# Patient Record
Sex: MALE | Race: WHITE
[De-identification: names, ages, dates, MRNs, and addresses within clinical notes are randomized per-mention and may not be internally consistent; named-entity substitution may affect disease eponyms.]

---

## 2021-07-03 ENCOUNTER — HOSPITAL ENCOUNTER (EMERGENCY)
Dept: HOSPITAL 75 - ER | Age: 66
Discharge: HOME | End: 2021-07-03
Payer: MEDICARE

## 2021-07-03 VITALS — BODY MASS INDEX: 25.58 KG/M2 | HEIGHT: 75.98 IN | WEIGHT: 210.1 LBS

## 2021-07-03 VITALS — SYSTOLIC BLOOD PRESSURE: 158 MMHG | DIASTOLIC BLOOD PRESSURE: 113 MMHG

## 2021-07-03 DIAGNOSIS — Z20.822: ICD-10-CM

## 2021-07-03 DIAGNOSIS — J98.09: ICD-10-CM

## 2021-07-03 DIAGNOSIS — F10.239: Primary | ICD-10-CM

## 2021-07-03 DIAGNOSIS — Z79.01: ICD-10-CM

## 2021-07-03 DIAGNOSIS — Z79.899: ICD-10-CM

## 2021-07-03 DIAGNOSIS — I11.0: ICD-10-CM

## 2021-07-03 DIAGNOSIS — I48.91: ICD-10-CM

## 2021-07-03 DIAGNOSIS — F41.9: ICD-10-CM

## 2021-07-03 DIAGNOSIS — F31.9: ICD-10-CM

## 2021-07-03 DIAGNOSIS — Z79.82: ICD-10-CM

## 2021-07-03 DIAGNOSIS — E11.9: ICD-10-CM

## 2021-07-03 DIAGNOSIS — Z79.84: ICD-10-CM

## 2021-07-03 DIAGNOSIS — I50.9: ICD-10-CM

## 2021-07-03 LAB
ALBUMIN SERPL-MCNC: 4.5 GM/DL (ref 3.2–4.5)
ALP SERPL-CCNC: 48 U/L (ref 40–136)
ALT SERPL-CCNC: 20 U/L (ref 0–55)
BASOPHILS # BLD AUTO: 0.1 10^3/UL (ref 0–0.1)
BASOPHILS NFR BLD AUTO: 1 % (ref 0–10)
BILIRUB SERPL-MCNC: 1.2 MG/DL (ref 0.1–1)
BUN/CREAT SERPL: 8
CALCIUM SERPL-MCNC: 9.5 MG/DL (ref 8.5–10.1)
CHLORIDE SERPL-SCNC: 95 MMOL/L (ref 98–107)
CO2 SERPL-SCNC: 19 MMOL/L (ref 21–32)
CREAT SERPL-MCNC: 1.02 MG/DL (ref 0.6–1.3)
EOSINOPHIL # BLD AUTO: 0.1 10^3/UL (ref 0–0.3)
EOSINOPHIL NFR BLD AUTO: 1 % (ref 0–10)
GFR SERPLBLD BASED ON 1.73 SQ M-ARVRAT: > 60 ML/MIN
GLUCOSE SERPL-MCNC: 151 MG/DL (ref 70–105)
HCT VFR BLD CALC: 38 % (ref 40–54)
HGB BLD-MCNC: 13.5 G/DL (ref 13.3–17.7)
LYMPHOCYTES # BLD AUTO: 0.9 10^3/UL (ref 1–4)
LYMPHOCYTES NFR BLD AUTO: 11 % (ref 12–44)
MANUAL DIFFERENTIAL PERFORMED BLD QL: NO
MCH RBC QN AUTO: 32 PG (ref 25–34)
MCHC RBC AUTO-ENTMCNC: 35 G/DL (ref 32–36)
MCV RBC AUTO: 90 FL (ref 80–99)
MONOCYTES # BLD AUTO: 0.6 10^3/UL (ref 0–1)
MONOCYTES NFR BLD AUTO: 7 % (ref 0–12)
NEUTROPHILS # BLD AUTO: 6.6 10^3/UL (ref 1.8–7.8)
NEUTROPHILS NFR BLD AUTO: 80 % (ref 42–75)
PLATELET # BLD: 179 10^3/UL (ref 130–400)
PMV BLD AUTO: 9.3 FL (ref 9–12.2)
POTASSIUM SERPL-SCNC: 4.7 MMOL/L (ref 3.6–5)
PROT SERPL-MCNC: 7.9 GM/DL (ref 6.4–8.2)
SODIUM SERPL-SCNC: 130 MMOL/L (ref 135–145)
WBC # BLD AUTO: 8.2 10^3/UL (ref 4.3–11)

## 2021-07-03 PROCEDURE — 93005 ELECTROCARDIOGRAM TRACING: CPT

## 2021-07-03 PROCEDURE — 87636 SARSCOV2 & INF A&B AMP PRB: CPT

## 2021-07-03 PROCEDURE — 80053 COMPREHEN METABOLIC PANEL: CPT

## 2021-07-03 PROCEDURE — 36415 COLL VENOUS BLD VENIPUNCTURE: CPT

## 2021-07-03 PROCEDURE — 84484 ASSAY OF TROPONIN QUANT: CPT

## 2021-07-03 PROCEDURE — 83880 ASSAY OF NATRIURETIC PEPTIDE: CPT

## 2021-07-03 PROCEDURE — 85025 COMPLETE CBC W/AUTO DIFF WBC: CPT

## 2021-07-03 PROCEDURE — 93041 RHYTHM ECG TRACING: CPT

## 2021-07-03 PROCEDURE — 86141 C-REACTIVE PROTEIN HS: CPT

## 2021-07-03 PROCEDURE — 71045 X-RAY EXAM CHEST 1 VIEW: CPT

## 2021-07-03 NOTE — DIAGNOSTIC IMAGING REPORT
EXAMINATION: Portable erect AP chest at 1:02 p.m.



INDICATION: Chest pain.



The heart is mildly enlarged and the heart does seem somewhat

more prominent than noted on the prior exam of 05/05/2021.

However there is still no evidence for failure, pneumonia or for

a significant pleural effusion. The mediastinum is not widened.

The osseous structures are intact.



IMPRESSION: There is mild cardiomegaly but there is no evidence

for active disease.



Dictated by: 



  Dictated on workstation # [id number]

## 2021-07-03 NOTE — ED GENERAL
General


Chief Complaint:  Cardiac/General Problems


Stated Complaint:  ELEV BP/SOB


Nursing Triage Note:  


PT ARRIVED BY PRIVATE VEHICLE WITH CHIEF COMPLAINT OF HYPERTENSION AND SOB. PT 


WAS ALERT, ORIENTED X 4 AND AMBULATORY. PT AMBULATED TO ROOM 9. PT STATED BP WAS




CHECKED 3 HOURS AGO AND WAS ELEVATED. HE TAKES BP MEDICATION AND TOOK IT, AND IT




DID NOT HELP. SOB STARTED LAST NIGHT. PT DENIES COUGH. PT HAS HAD BOTH MODERNA 


COVID VACCINES. IV WAS STARTED WITH BLOOD DRAW, COVID SWAB, VITAL SIGNS HEART 


MONITOR AND EKG. REPORT GIVEN TO PROVIDER.


Source of Information:  Patient


Exam Limitations:  No Limitations





History of Present Illness


Date Seen by Provider:  Jul 3, 2021


Time Seen by Provider:  12:54


Initial Comments


This 66-year-old gentleman presents to the emergency room with complaints of 

shortness of breath and hypertension.  He is accustomed to drinking a daily but 

has not had any alcohol today.  He does not have known respiratory problems but 

he appears to have decreased air movement and prolonged expiratory phase.  He 

denies any fever.  He has been vaccinated for COVID-19.  He has poor eye contact

and appears a bit anxious.  He has upper extremity tremors.  He has history of 

A. fib and is anticoagulated.





Allergies and Home Medications


Allergies


Coded Allergies:  


     No Known Drug Allergies (Unverified , 12)





Home Medications


Aspirin 81 Mg Tablet.dr, 81 MG PO DAILY


   Prescribed by: RAMONA THOMAS on 21 1211


Clonidine HCl 0.1 Mg Tablet, 0.1 MG PO HS PRN for SLEEP


   Prescribed by: RAMONA THOMAS on 21 1211


Diltiazem HCl 120 Mg Cap.er.24h, 120 MG PO DAILY


   LAST FILLED 2020 #90 


   Prescribed by: RAMONA THOMAS on 21 1211


Escitalopram Oxalate 10 Mg Tablet, 20 MG PO HS


   TAKES 2 (10MG) TABS 


   Prescribed by: RAMONA THOMAS on 21 1211


Folic Acid 1 Mg Tablet, 1 MG PO DAILY


   Prescribed by: RAMONA THOMAS on 21 1211


Furosemide 20 Mg Tablet, 20 MG PO DAILY PRN for FLUID RETENTION


   Prescribed by: RAMONA THOMAS on 21 1211


Hydroxyzine HCl 50 Mg Tablet, 50 MG PO BID PRN for ANXIETY


   Prescribed by: RAMONA THOMAS on 21 1211


Ibuprofen 200 Mg Capsule, 400 MG PO Q8H PRN for PAIN-MILD (1-4), (Reported)


Lorazepam 0.5 Mg Tablet, 0.5 MG PO TID PRN for ANXIETY


   Prescribed by: RAMONA THOMAS on 21 1211


Lorazepam 0.5 Mg Tablet, 0.5 MG PO TID PRN for AGITATION


   For withdrawal symptoms 


   Prescribed by: FREDDIE ADAM on 7/3/21 1501


Magnesium Oxide 400 Mg Tablet, 400 MG PO BID


   Prescribed by: RAMONA THOMAS on 21 1211


Metformin HCl 500 Mg Tablet, 500 MG PO BID


   LAST FILLED 2021 #180/90 DAY SUPPLY 


   Prescribed by: RAMONA THOMAS on 21 1211


Metoprolol Succinate 100 Mg Tab.er.24h, 100 MG PO DAILY


   LAST FILLED 2020 #90/90 DAY SUPPLY 


   Prescribed by: RAMONA THOMAS on 21 1211


Multivitamin 1 Each Tablet, 1 EACH PO DAILY


   Prescribed by: RAMONA THOMAS on 21 1211


Olanzapine 10 Mg Tablet, 10 MG PO HS


   Prescribed by: RAMONA THOMAS on 21 1211


Rivaroxaban 20 Mg Tablet, 20 MG PO DAILY


   340 B Program 


   Prescribed by: JASSI DRAKE on 21 0816


Thiamine HCl 100 Mg Tablet, 100 MG PO DAILY@0700


   Prescribed by: RAMONA THOMAS on 21 1211


Trazodone HCl 50 Mg Tablet, 50 MG PO HS


   Prescribed by: RAMONA THOMAS on 21 1211


Triamcinolone Acet 15 Gm Cr, 15 GM TP BID


   Prescribed by: RAMONA THOMAS on 21 1215





Patient Home Medication List


Home Medication List Reviewed:  Yes





Review of Systems


Review of Systems


Constitutional:  no symptoms reported


EENTM:  no symptoms reported


Respiratory:  see HPI


Cardiovascular:  see HPI


Gastrointestinal:  no symptoms reported


Genitourinary:  no symptoms reported


Musculoskeletal:  no symptoms reported


Skin:  no symptoms reported


Psychiatric/Neurological:  See HPI


Hematologic/Lymphatic:  No Symptoms Reported


Immunological/Allergic:  no symptoms reported





Past Medical-Social-Family Hx


Patient Social History


Tobacco Use?:  No


Use of E-Cig and/or Vaping dev:  No


Substance use?:  No


Alcohol Use?:  Yes


Alcohol type:  Beer


Alcohol Frequency:  Daily


Pt feels they are or have been:  No





Past Medical History


Surgeries:  Yes


Gallbladder


Respiratory:  No


Cardiac:  Yes (History CHF)


Atrial Fibrillation, Hypertension


Neurological:  No


Reproductive Disorders:  No


Genitourinary:  No


Gastrointestinal:  No


Musculoskeletal:  No


Endocrine:  Yes


Diabetes, Non-Insulin dep


HEENT:  No


Cancer:  No


Did You Recieve Any Treatments:  No


Psychosocial:  Yes


Anxiety, Bipolar, Depression


Integumentary:  No





Physical Exam


Vital Signs





Vital Signs - First Documented








 7/3/21





 12:50


 


Temp 37.2


 


Pulse 82


 


Resp 12


 


B/P (MAP) 182/136 (151)


 


Pulse Ox 97


 


O2 Delivery Room Air





Capillary Refill : Less Than 3 Seconds


Height, Weight, BMI


Height: '"


Weight: lbs. oz. kg; 25.00 BMI


Method:


General Appearance:  No Apparent Distress, WD/WN


HEENT:  PERRL/EOMI, Normal ENT Inspection


Neck:  Normal Inspection


Respiratory:  Lungs Clear, Other (Decreased air movement with prolonged 

expiratory phase)


Cardiovascular:  No Edema, No Murmur, Irregularly Irregular


Gastrointestinal:  Non Tender, Soft


Extremity:  Normal Inspection, No Pedal Edema


Neurologic/Psychiatric:  Alert, Oriented x3, No Motor/Sensory Deficits, CNs II-

XII Norm as Tested, Other (Upper extremity tremors, poor eye contact, perhaps 

mildly anxious)


Skin:  Normal Color, Warm/Dry





Progress/Results/Core Measures


Suspected Sepsis


SIRS


Temperature: 


Pulse: 82 


Respiratory Rate: 12


 


Laboratory Tests


7/3/21 13:00: White Blood Count 8.2


Blood Pressure 182 /136 


Mean: 151


 


Laboratory Tests


7/3/21 13:00: 


Creatinine 1.02, Platelet Count 179, Total Bilirubin 1.2H








Results/Orders


Lab Results





Laboratory Tests








Test


 7/3/21


13:00 Range/Units


 


 


White Blood Count


 8.2 


 4.3-11.0


10^3/uL


 


Red Blood Count


 4.27 L


 4.30-5.52


10^6/uL


 


Hemoglobin 13.5  13.3-17.7  g/dL


 


Hematocrit 38 L 40-54  %


 


Mean Corpuscular Volume 90  80-99  fL


 


Mean Corpuscular Hemoglobin 32  25-34  pg


 


Mean Corpuscular Hemoglobin


Concent 35 


 32-36  g/dL





 


Red Cell Distribution Width 12.6  10.0-14.5  %


 


Platelet Count


 179 


 130-400


10^3/uL


 


Mean Platelet Volume 9.3  9.0-12.2  fL


 


Immature Granulocyte % (Auto) 1   %


 


Neutrophils (%) (Auto) 80 H 42-75  %


 


Lymphocytes (%) (Auto) 11 L 12-44  %


 


Monocytes (%) (Auto) 7  0-12  %


 


Eosinophils (%) (Auto) 1  0-10  %


 


Basophils (%) (Auto) 1  0-10  %


 


Neutrophils # (Auto)


 6.6 


 1.8-7.8


10^3/uL


 


Lymphocytes # (Auto)


 0.9 L


 1.0-4.0


10^3/uL


 


Monocytes # (Auto)


 0.6 


 0.0-1.0


10^3/uL


 


Eosinophils # (Auto)


 0.1 


 0.0-0.3


10^3/uL


 


Basophils # (Auto)


 0.1 


 0.0-0.1


10^3/uL


 


Immature Granulocyte # (Auto)


 0.1 


 0.0-0.1


10^3/uL


 


Sodium Level 130 L 135-145  MMOL/L


 


Potassium Level 4.7  3.6-5.0  MMOL/L


 


Chloride Level 95 L   MMOL/L


 


Carbon Dioxide Level 19 L 21-32  MMOL/L


 


Anion Gap 16 H 5-14  MMOL/L


 


Blood Urea Nitrogen 8  7-18  MG/DL


 


Creatinine


 1.02 


 0.60-1.30


MG/DL


 


Estimat Glomerular Filtration


Rate > 60 


  





 


BUN/Creatinine Ratio 8   


 


Glucose Level 151 H   MG/DL


 


Calcium Level 9.5  8.5-10.1  MG/DL


 


Corrected Calcium 9.1  8.5-10.1  MG/DL


 


Total Bilirubin 1.2 H 0.1-1.0  MG/DL


 


Aspartate Amino Transf


(AST/SGOT) 36 H


 5-34  U/L





 


Alanine Aminotransferase


(ALT/SGPT) 20 


 0-55  U/L





 


Alkaline Phosphatase 48    U/L


 


Troponin I < 0.028  <0.028  NG/ML


 


C-Reactive Protein High


Sensitivity 0.43 


 0.00-0.50


MG/DL


 


B-Type Natriuretic Peptide 472.1 H <100.0  PG/ML


 


Total Protein 7.9  6.4-8.2  GM/DL


 


Albumin 4.5  3.2-4.5  GM/DL


 


Influenza Type A (RT-PCR) Not Detected  Not Detecte  


 


Influenza Type B (RT-PCR) Not Detected  Not Detecte  


 


SARS-CoV-2 RNA (RT-PCR) Not Detected  Not Detecte  








My Orders





Orders - FREDDIE HODGE MD


Lorazepam Injection (Ativan Injection) (7/3/21 13:15)


Lorazepam Injection (Ativan Injection) (7/3/21 13:12)


BNP (7/3/21 13:13)


Cbc With Automated Diff (7/3/21 13:13)


Comprehensive Metabolic Panel (7/3/21 13:13)


Hs C Reactive Protein (7/3/21 13:13)


Troponin I (7/3/21 13:13)


Ed Iv/Invasive Line Start (7/3/21 13:13)


Ekg Tracing (7/3/21 13:13)


Monitor-Rhythm Ecg Trace Only (7/3/21 13:13)


Covid 19 Inhouse Test (7/3/21 13:13)


Influenza A And B By Pcr (7/3/21 13:13)


Albuterol Inhaler (Ventolin Hfa) (7/3/21 18:00)


Lorazepam Injection (Ativan Injection) (7/3/21 14:30)


Albuterol Inhaler (Ventolin Hfa) (7/3/21 14:26)





Medications Given in ED





Vital Signs/I&O











 7/3/21 7/3/21





 12:50 15:23


 


Temp 37.2 


 


Pulse 82 90


 


Resp 12 16


 


B/P (MAP) 182/136 (151) 158/113


 


Pulse Ox 97 97


 


O2 Delivery Room Air Room Air





Capillary Refill : Less Than 3 Seconds








Blood Pressure Mean:                    151








Progress Note :  


Progress Note


Work-up revealed no pathology that required immediate intervention.  Symptoms 

improved with Ativan and inhaler.  He does not intend to resume drinking alcohol

when he returns home.  A small quantity of Ativan was therefore prescribed to h

elp him with withdrawal symptoms.  See discharge instructions.





ECG


Initial ECG Impression Date:  Jul 3, 2021


Initial ECG Impression Time:  13:13


Initial ECG Rate:  87


Initial ECG Rhythm:  A Fib/Flutter


Initial ECG Impression:  Atrial Fibrillation w/RVR


Comment


Atrial fibrillation with no diagnostic ST elevation or depression.  Rate 

controlled.  No abnormal intervals.





Diagnostic Imaging





   Diagonstic Imaging:  Xray


   Plain Films/CT/US/NM/MRI:  chest


Comments


NAME:   LALA GAYLE


MED REC#:   [id number]


ACCOUNT#:   [id number]


PT STATUS:   REG ER


:   1955


PHYSICIAN:   KEITH BOB


ADMIT DATE:   21/ER


***Signed***


Date of Exam:21





CHEST 1 VIEW, AP/PA ONLY








EXAMINATION: Portable erect AP chest at 1:02 p.m.





INDICATION: Chest pain.





The heart is mildly enlarged and the heart does seem somewhat


more prominent than noted on the prior exam of 2021.


However there is still no evidence for failure, pneumonia or for


a significant pleural effusion. The mediastinum is not widened.


The osseous structures are intact.





IMPRESSION: There is mild cardiomegaly but there is no evidence


for active disease.





Dictated by: 





  Dictated on workstation # [id number]








Dict:   21 1307


Trans:   21 1323


[facility] [id number]





Interpreted by:     MAXIMO HAYES MD


Electronically signed by: MAXIMO HAYES MD 21 1323





Departure


Impression





   Primary Impression:  


   Alcohol withdrawal


   Qualified Codes:  F10.239 - Alcohol dependence with withdrawal, unspecified


   Additional Impression:  


   Bronchoconstriction


Disposition:  01 HOME, SELF-CARE


Condition:  Improved





Departure-Patient Inst.


Decision time for Depature:  14:58


Referrals:  


HUMAIRA HERNANDEZ MD (PCP/Family)


Primary Care Physician


Patient Instructions:  Alcohol Withdrawal, BRONCHOSPASM-ADULT





Add. Discharge Instructions:  


Do not abruptly stop alcohol consumption as this may cause life-threatening 

withdrawal symptoms or seizures.  Either taper off of alcohol slowly or control 

symptoms with a medication such as Ativan (lorazepam).


Use your inhaler up to 2 puffs every 4 hours as needed for wheezing or shortness

of breath.  Avoid inhaled irritants such as cigar smoke.


Follow-up with your primary care provider soon as possible.


Return to the emergency room if you have worsening symptoms.


Call with questions or concerns.





All discharge instructions reviewed with patient and/or family. Voiced 

understanding.


Scripts


Lorazepam (Ativan) 0.5 Mg Tablet


0.5 MG PO TID PRN for AGITATION, #10 TAB


   For withdrawal symptoms


   Prov: FREDDIE HODGE MD         7/3/21





Copy


Copies To 1:   HUMAIRA HERNANDEZ MD, JOSHUA T MD         Jul 3, 2021 15:01

## 2021-09-09 ENCOUNTER — HOSPITAL ENCOUNTER (EMERGENCY)
Dept: HOSPITAL 75 - ER | Age: 66
LOS: 1 days | Discharge: HOME | End: 2021-09-10
Payer: MEDICARE

## 2021-09-09 VITALS — WEIGHT: 218.26 LBS | BODY MASS INDEX: 26.58 KG/M2 | HEIGHT: 75.98 IN

## 2021-09-09 DIAGNOSIS — E11.9: ICD-10-CM

## 2021-09-09 DIAGNOSIS — F41.9: ICD-10-CM

## 2021-09-09 DIAGNOSIS — I11.0: ICD-10-CM

## 2021-09-09 DIAGNOSIS — I48.91: ICD-10-CM
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
## 2021-09-09 DIAGNOSIS — Z79.84: ICD-10-CM

## 2021-09-09 DIAGNOSIS — Z79.82: ICD-10-CM

## 2021-09-09 DIAGNOSIS — I50.9: ICD-10-CM

## 2021-09-09 DIAGNOSIS — F31.9: ICD-10-CM

## 2021-09-09 DIAGNOSIS — S01.01XA: Primary | ICD-10-CM

## 2021-09-09 DIAGNOSIS — Z79.01: ICD-10-CM

## 2021-09-09 DIAGNOSIS — Z79.899: ICD-10-CM

## 2021-09-09 LAB
BASOPHILS # BLD AUTO: 0 10^3/UL (ref 0–0.1)
BASOPHILS NFR BLD AUTO: 1 % (ref 0–10)
EOSINOPHIL # BLD AUTO: 0.2 10^3/UL (ref 0–0.3)
EOSINOPHIL NFR BLD AUTO: 4 % (ref 0–10)
HCT VFR BLD CALC: 40 % (ref 40–54)
HGB BLD-MCNC: 13.3 G/DL (ref 13.3–17.7)
LYMPHOCYTES # BLD AUTO: 1.8 10^3/UL (ref 1–4)
LYMPHOCYTES NFR BLD AUTO: 34 % (ref 12–44)
MCH RBC QN AUTO: 31 PG (ref 25–34)
MCHC RBC AUTO-ENTMCNC: 33 G/DL (ref 32–36)
MCV RBC AUTO: 92 FL (ref 80–99)
MONOCYTES # BLD AUTO: 0.6 10^3/UL (ref 0–1)
MONOCYTES NFR BLD AUTO: 11 % (ref 0–12)
NEUTROPHILS # BLD AUTO: 2.6 10^3/UL (ref 1.8–7.8)
NEUTROPHILS NFR BLD AUTO: 49 % (ref 42–75)
PLATELET # BLD: 160 10^3/UL (ref 130–400)
PMV BLD AUTO: 9.3 FL (ref 9–12.2)
WBC # BLD AUTO: 5.3 10^3/UL (ref 4.3–11)

## 2021-09-09 PROCEDURE — 80053 COMPREHEN METABOLIC PANEL: CPT

## 2021-09-09 PROCEDURE — 85025 COMPLETE CBC W/AUTO DIFF WBC: CPT

## 2021-09-09 PROCEDURE — 72125 CT NECK SPINE W/O DYE: CPT

## 2021-09-09 PROCEDURE — 70450 CT HEAD/BRAIN W/O DYE: CPT

## 2021-09-09 PROCEDURE — 36415 COLL VENOUS BLD VENIPUNCTURE: CPT

## 2021-09-09 NOTE — DIAGNOSTIC IMAGING REPORT
PROCEDURE: CT head and CT cervical spine without contrast.



TECHNIQUE: Multiple contiguous axial images were obtained through

the brain and cervical spine without the use of intravenous

contrast. Sagittal and coronal reformations through the cervical

spine were then performed. Auto Exposure Controls were utilized

during the CT exam to meet ALARA standards for radiation dose

reduction. 



INDICATION:  Fall, laceration to right side of head.



COMPARISON: None available. 



FINDINGS:



Head:

No hyperdense hemorrhage or space-occupying mass. No

hydrocephalus or midline shift. No evidence of territorial

infarct. Basilar cisterns are patent. Right frontal scalp

laceration. No skull fracture. The paranasal sinuses and mastoid

air cells are clear. 



Cervical spine:

No acute fracture or traumatic malalignment. No high-grade spinal

canal narrowing. Airway is patent. No cervical lymphadenopathy.

Visualized thyroid is normal. 



IMPRESSION:

1. No acute intracranial process or skull fracture. 

2. No acute fracture or traumatic malalignment of the cervical

spine. 



 



Dictated by: 



  Dictated on workstation # [id number]

## 2021-09-09 NOTE — ED FALL/INJURY
General


Chief Complaint:  Trauma EMS/Air Arrival Activat


Stated Complaint:  FALL,HEAD LAC


Nursing Triage Note:  


Pt arrival to ER via EMS after fall at home. Pt states that he stood up after 


waiting too long after taking his medication and got dizzy. Pt states that he 


knelt to his knees then fell to the side striking right side of head on floor. 


Pt denies loss of consciousness. PT states that he gets dizzy sometimes after 


waiting too long after taking night time meds. Pt states that he takes xarelto 


for his Atrial Fib. Pt denies other complaints. Pain at a 1/10. Pt is alert and 


oriented x4.


Source:  patient


Exam Limitations:  no limitations





History of Present Illness


Date Seen by Provider:  Sep 9, 2021


Time Seen by Provider:  23:21


Initial Comments


Patient presents ER by EMS from home with chief complaint that he had a fall 

after standing up and going to the bathroom.  She has gets dizzy sometimes after

taking his medications for atrial fibrillation.  He is on Xarelto.  Dr. Paz is his PCP.  No nausea vomiting diarrhea.  Small head lack to the 

right side of his head.  It is hemostatic by time EMS arrived.





Allergies and Home Medications


Allergies


Coded Allergies:  


     No Known Drug Allergies (Unverified , 12)





Patient Home Medication List


Home Medication List Reviewed:  Yes


Aspirin (Aspirin EC) 81 Mg Tablet.dr, 81 MG PO DAILY


   Prescribed by: RAMONA THOMAS on 21 121


Clonidine HCl (Clonidine HCl) 0.1 Mg Tablet, 0.1 MG PO HS PRN for SLEEP


   Prescribed by: RAMONA THOMAS on 21 1211


Diltiazem HCl (Diltiazem 24Hr ER) 120 Mg Cap.er.24h, 120 MG PO DAILY


   Prescribed by: RAMONA THOMAS on 21 1211


Escitalopram Oxalate (Escitalopram Oxalate) 10 Mg Tablet, 20 MG PO HS


   Prescribed by: RAMONA THOMAS on 21 1211


Folic Acid (Folic Acid) 1 Mg Tablet, 1 MG PO DAILY


   Prescribed by: RAMONA THOMAS on 21 121


Furosemide (Furosemide) 20 Mg Tablet, 20 MG PO DAILY PRN for FLUID RETENTION


   Prescribed by: RAMONA THOMAS on 21 1211


Hydroxyzine HCl (Hydroxyzine HCl) 50 Mg Tablet, 50 MG PO BID PRN for ANXIETY


   Prescribed by: RAMONA THOMAS on 21 121


Ibuprofen (Ibuprofen) 200 Mg Capsule, 400 MG PO Q8H PRN for PAIN-MILD (1-4), 

(Reported)


   Entered as Reported by: SCAR ZHAO on 21 0910


Lorazepam (Ativan) 0.5 Mg Tablet, 0.5 MG PO TID PRN for ANXIETY


   Prescribed by: RAMONA THOMAS on 21 121


Lorazepam (Ativan) 0.5 Mg Tablet, 0.5 MG PO TID PRN for AGITATION


   Prescribed by: FREDDIE ADAM on 7/3/21 1501


Magnesium Oxide (Magnesium Oxide) 400 Mg Tablet, 400 MG PO BID


   Prescribed by: RAMONA THOMAS on 21 121


Metformin HCl (Metformin HCl) 500 Mg Tablet, 500 MG PO BID


   Prescribed by: RAMONA THOMAS on 21 121


Metoprolol Succinate (Metoprolol Succinate) 100 Mg Tab.er.24h, 100 MG PO DAILY


   Prescribed by: RAMONA THOMAS on 21 121


Multivitamin (Multivitamin) 1 Each Tablet, 1 EACH PO DAILY


   Prescribed by: RAMONA THOMAS on 21 121


Olanzapine (Zyprexa) 10 Mg Tablet, 10 MG PO HS


   Prescribed by: RAMONA THOMAS on 21 121


Rivaroxaban (Xarelto) 20 Mg Tablet, 20 MG PO DAILY


   Prescribed by: JASSI DRAKE on 21 0816


Thiamine HCl (Vitamin B-1) 100 Mg Tablet, 100 MG PO DAILY@0700


   Prescribed by: RAMONA THOMAS on 21 121


Trazodone HCl (Trazodone HCl) 50 Mg Tablet, 50 MG PO HS


   Prescribed by: RAMONA THOMAS on 21 121


Triamcinolone Acet (Triamcinolone Acetonide 0.1% Cream) 15 Gm Cr, 15 GM TP BID


   Prescribed by: RAMONA THOMAS on 21 1215





Review of Systems


Review of Systems


Constitutional:  No chills, No diaphoresis


Eyes:  Denies Blindness, Denies Blurred Vision


Cardiovascular:  No chest pain, No palpitations


Gastrointestinal:  No abdominal pain, No nausea, No vomiting


Genitourinary:  No discharge, No dysuria


Musculoskeletal:  No back pain, No joint pain





All Other Systems Reviewed


Negative Unless Noted:  Yes





Past Medical-Social-Family Hx


Patient Social History


Tobacco Use?:  No


Use of E-Cig and/or Vaping dev:  No


Substance use?:  No


Alcohol Use?:  Yes


Alcohol type:  Beer


Alcohol Frequency:  Daily


Pt feels they are or have been:  No





Immunizations Up To Date


Influenza Vaccine Up-to-Date:  No; Not Current


Second COVID19 Vaccination Thor:  3/21


COVID19 Vaccine :  Moderna





Past Medical History


Surgeries:  Yes


Gallbladder


Respiratory:  No


Cardiac:  Yes (History CHF)


Atrial Fibrillation, Hypertension


Neurological:  No


Reproductive Disorders:  No


Genitourinary:  No


Gastrointestinal:  No


Musculoskeletal:  No


Endocrine:  Yes


Diabetes, Non-Insulin dep


HEENT:  No


Cancer:  No


Did You Recieve Any Treatments:  No


Psychosocial:  Yes


Anxiety, Bipolar, Depression


Integumentary:  No





Physical Exam


Vital Signs





Vital Signs - First Documented








 21





 23:14


 


Temp 35.9


 


Pulse 61


 


Resp 18


 


B/P (MAP) 110/81 (91)


 


Pulse Ox 97


 


O2 Delivery Room Air





Capillary Refill : Less Than 3 Seconds


Height, Weight, BMI


Height: '"


Weight: lbs. oz. kg; 26.00 BMI


Method:


General Appearance:  WD/WN, no apparent distress


HEENT:  PERRL/EOMI, normal ENT inspection, pharynx normal


Neck:  full range of motion, supple, normal inspection


Cardiovascular:  normal peripheral pulses, regular rate, rhythm


Respiratory:  lungs clear, normal breath sounds, no respiratory distress, no 

accessory muscle use


Peripheral Pulses:  2+ Radial Pulses (R), 2+ Radial Pulses (L)


Gastrointestinal:  normal bowel sounds, non tender, soft


Extremities:  normal range of motion, normal capillary refill


Neurologic/Psychiatric:  alert, normal mood/affect, oriented x 3


Skin:  normal color, warm/dry, other (1-1/2 cm lambda shaped laceration oozing 

sanguinous discharge out of the right temple)





Alanson Coma Score


Best Eye Response:  (4) Open Spontaneously


Best Verbal Response:  (5) Oriented


Best Motor Response:  (6) Obeys Commands


Alanson Total:  15





Procedures/Interventions





   Wound Location:  Scalp


Other Wound Location


right temple


   Wound Length (cm):  1.2


   Wound's Depth, Shape:  linear, sub Q


   Wound Explored:  no foreign body removed


   Irrigated w/ Saline (ccs):  200


   Betadine Prep?:  No


   Anesthesia:  1% Lidocaine


   Volume Anesthetic (ccs):  1


   Wound Debrided:  minimal


   Staple Repair:  [place] 35W


   Number of Sutures:  1


   Sterile Dressing Applied?:  Yes





Progress/Results/Core Measures


Results/Orders


Lab Results





Laboratory Tests








Test


 21


23:43 Range/Units


 


 


White Blood Count


 5.3 


 4.3-11.0


10^3/uL


 


Red Blood Count


 4.31 


 4.30-5.52


10^6/uL


 


Hemoglobin 13.3  13.3-17.7  g/dL


 


Hematocrit 40  40-54  %


 


Mean Corpuscular Volume 92  80-99  fL


 


Mean Corpuscular Hemoglobin 31  25-34  pg


 


Mean Corpuscular Hemoglobin


Concent 33 


 32-36  g/dL





 


Red Cell Distribution Width 12.3  10.0-14.5  %


 


Platelet Count


 160 


 130-400


10^3/uL


 


Mean Platelet Volume 9.3  9.0-12.2  fL


 


Immature Granulocyte % (Auto) 1   %


 


Neutrophils (%) (Auto) 49  42-75  %


 


Lymphocytes (%) (Auto) 34  12-44  %


 


Monocytes (%) (Auto) 11  0-12  %


 


Eosinophils (%) (Auto) 4  0-10  %


 


Basophils (%) (Auto) 1  0-10  %


 


Neutrophils # (Auto)


 2.6 


 1.8-7.8


10^3/uL


 


Lymphocytes # (Auto)


 1.8 


 1.0-4.0


10^3/uL


 


Monocytes # (Auto)


 0.6 


 0.0-1.0


10^3/uL


 


Eosinophils # (Auto)


 0.2 


 0.0-0.3


10^3/uL


 


Basophils # (Auto)


 0.0 


 0.0-0.1


10^3/uL


 


Immature Granulocyte # (Auto)


 0.0 


 0.0-0.1


10^3/uL


 


Potassium Level 4.2  3.6-5.0  MMOL/L


 


Chloride Level 95 L   MMOL/L


 


Carbon Dioxide Level 18 L 21-32  MMOL/L


 


Anion Gap 12  5-14  MMOL/L


 


Blood Urea Nitrogen 7  7-18  MG/DL


 


Creatinine


 0.98 


 0.60-1.30


MG/DL


 


Estimat Glomerular Filtration


Rate 76 


  





 


BUN/Creatinine Ratio 7   


 


Glucose Level 139 H   MG/DL


 


Calcium Level 9.1  8.5-10.1  MG/DL


 


Corrected Calcium 9.1  8.5-10.1  MG/DL


 


Total Bilirubin 0.7  0.1-1.0  MG/DL


 


Aspartate Amino Transf


(AST/SGOT) 22 


 5-34  U/L





 


Alanine Aminotransferase


(ALT/SGPT) 16 


 0-55  U/L





 


Alkaline Phosphatase 39 L   U/L


 


Total Protein 7.2  6.4-8.2  GM/DL


 


Albumin 4.0  3.2-4.5  GM/DL








My Orders





Orders - CARLOZ ROSENBERG


Ct Head/Cervical Spine Wo (21 23:24)


Comprehensive Metabolic Panel (21 23:24)


Cbc With Automated Diff (21 23:41)





Vital Signs/I&O











 21





 23:14


 


Temp 35.9


 


Pulse 61


 


Resp 18


 


B/P (MAP) 110/81 (91)


 


Pulse Ox 97


 


O2 Delivery Room Air














Blood Pressure Mean:                    91











Progress


Progress Note :  


   Time:  23:45


Progress Note


CT of the head and C-spine.  Will clean up the laceration.  He is up-to-date on 

his tetanus vaccine.


Level 2 trauma was paged.  Dr. Granda is present and agrees with plan and 

disposition.





Diagnostic Imaging





   Diagonstic Imaging:  CT


   Plain Films/CT/US/NM/MRI:  c-spine, head


Comments


                 [facility] [place], [place]





NAME:   LALA GAYLE


MED REC#:   [id number]


ACCOUNT#:   [id number]


PT STATUS:   REG ER


:   1955


PHYSICIAN:   CARLOZ ROSENBERG MD


ADMIT DATE:   21/ER


                                  ***Signed***


Date of Exam:21





CT HEAD/CERVICAL SPINE WO








PROCEDURE: CT head and CT cervical spine without contrast.





TECHNIQUE: Multiple contiguous axial images were obtained through


the brain and cervical spine without the use of intravenous


contrast. Sagittal and coronal reformations through the cervical


spine were then performed. Auto Exposure Controls were utilized


during the CT exam to meet ALARA standards for radiation dose


reduction. 





INDICATION:  Fall, laceration to right side of head.





COMPARISON: None available. 





FINDINGS:





Head:


No hyperdense hemorrhage or space-occupying mass. No


hydrocephalus or midline shift. No evidence of territorial


infarct. Basilar cisterns are patent. Right frontal scalp


laceration. No skull fracture. The paranasal sinuses and mastoid


air cells are clear. 





Cervical spine:


No acute fracture or traumatic malalignment. No high-grade spinal


canal narrowing. Airway is patent. No cervical lymphadenopathy.


Visualized thyroid is normal. 





IMPRESSION:


1. No acute intracranial process or skull fracture. 


2. No acute fracture or traumatic malalignment of the cervical


spine. 





 





Dictated by: 





  Dictated on workstation # [id number]








Dict:   21


Trans:   21


[facility] [id number]





Interpreted by:     SANJAY FAULKNER MD


Electronically signed by: SANJAY FAULKNER MD 21


   Reviewed:  Reviewed by Me





Departure


Impression





   Primary Impression:  


   Fall


   Qualified Codes:  W19.XXXA - Unspecified fall, initial encounter


   Additional Impression:  


   Laceration of scalp


   Qualified Codes:  S01.01XA - Laceration without foreign body of scalp, 

   initial encounter


Disposition:   HOME, SELF-CARE


Condition:  Stable





Departure-Patient Inst.


Decision time for Depature:  00:11


Referrals:  


HUMAIRA HERNANDEZ MD (PCP/Family)


Primary Care Physician


Patient Instructions:  Preventing Falls ED, Laceration Repair With Staples (DC)





Add. Discharge Instructions:  


Return to the ER in 7 days to have the staples removed no additional charge.


Keep the wound clean with regular soap and water.


Return sooner if you are having weakness, numbness, slurred speech or confusion.





All discharge instructions reviewed with patient and/or family. Voiced 

understanding.











CARLOZ ROSENBERG                  Sep 2021 23:45

## 2021-09-10 VITALS — SYSTOLIC BLOOD PRESSURE: 106 MMHG | DIASTOLIC BLOOD PRESSURE: 77 MMHG

## 2021-09-10 LAB
ALBUMIN SERPL-MCNC: 4 GM/DL (ref 3.2–4.5)
ALP SERPL-CCNC: 39 U/L (ref 40–136)
ALT SERPL-CCNC: 16 U/L (ref 0–55)
BILIRUB SERPL-MCNC: 0.7 MG/DL (ref 0.1–1)
BUN/CREAT SERPL: 7
CALCIUM SERPL-MCNC: 9.1 MG/DL (ref 8.5–10.1)
CHLORIDE SERPL-SCNC: 95 MMOL/L (ref 98–107)
CO2 SERPL-SCNC: 18 MMOL/L (ref 21–32)
CREAT SERPL-MCNC: 0.98 MG/DL (ref 0.6–1.3)
GFR SERPLBLD BASED ON 1.73 SQ M-ARVRAT: 76 ML/MIN
GLUCOSE SERPL-MCNC: 139 MG/DL (ref 70–105)
MANUAL DIFFERENTIAL PERFORMED BLD QL: NO
POTASSIUM SERPL-SCNC: 4.2 MMOL/L (ref 3.6–5)
PROT SERPL-MCNC: 7.2 GM/DL (ref 6.4–8.2)
SODIUM SERPL-SCNC: 125 MMOL/L (ref 135–145)

## 2021-09-17 ENCOUNTER — HOSPITAL ENCOUNTER (EMERGENCY)
Dept: HOSPITAL 75 - ER | Age: 66
Discharge: HOME | End: 2021-09-17
Payer: MEDICARE

## 2021-09-17 VITALS — SYSTOLIC BLOOD PRESSURE: 135 MMHG | DIASTOLIC BLOOD PRESSURE: 95 MMHG

## 2021-09-17 DIAGNOSIS — Z48.02: Primary | ICD-10-CM

## 2021-09-20 ENCOUNTER — HOSPITAL ENCOUNTER (OUTPATIENT)
Dept: HOSPITAL 75 - LAB | Age: 66
End: 2021-09-20
Attending: NURSE PRACTITIONER
Payer: MEDICARE

## 2021-09-20 DIAGNOSIS — E87.1: Primary | ICD-10-CM

## 2021-09-20 LAB
ALBUMIN SERPL-MCNC: 4.4 GM/DL (ref 3.2–4.5)
ALP SERPL-CCNC: 40 U/L (ref 40–136)
ALT SERPL-CCNC: 20 U/L (ref 0–55)
BILIRUB SERPL-MCNC: 1.4 MG/DL (ref 0.1–1)
BUN/CREAT SERPL: 7
CALCIUM SERPL-MCNC: 9.6 MG/DL (ref 8.5–10.1)
CHLORIDE SERPL-SCNC: 96 MMOL/L (ref 98–107)
CO2 SERPL-SCNC: 19 MMOL/L (ref 21–32)
CREAT SERPL-MCNC: 0.96 MG/DL (ref 0.6–1.3)
GFR SERPLBLD BASED ON 1.73 SQ M-ARVRAT: 78 ML/MIN
GLUCOSE SERPL-MCNC: 125 MG/DL (ref 70–105)
POTASSIUM SERPL-SCNC: 5 MMOL/L (ref 3.6–5)
PROT SERPL-MCNC: 7.7 GM/DL (ref 6.4–8.2)
SODIUM SERPL-SCNC: 128 MMOL/L (ref 135–145)

## 2021-09-20 PROCEDURE — 36415 COLL VENOUS BLD VENIPUNCTURE: CPT

## 2021-09-20 PROCEDURE — 80053 COMPREHEN METABOLIC PANEL: CPT

## 2021-09-20 PROCEDURE — 83935 ASSAY OF URINE OSMOLALITY: CPT

## 2021-11-01 ENCOUNTER — HOSPITAL ENCOUNTER (OUTPATIENT)
Dept: HOSPITAL 75 - PREOP | Age: 66
LOS: 1 days | Discharge: HOME | End: 2021-11-02
Attending: SPECIALIST
Payer: MEDICARE

## 2021-11-01 VITALS — WEIGHT: 210.32 LBS | HEIGHT: 75.98 IN | BODY MASS INDEX: 25.61 KG/M2

## 2021-11-01 DIAGNOSIS — Z01.818: Primary | ICD-10-CM

## 2021-11-05 ENCOUNTER — HOSPITAL ENCOUNTER (OUTPATIENT)
Dept: HOSPITAL 75 - SDC | Age: 66
End: 2021-11-05
Attending: SPECIALIST
Payer: MEDICARE

## 2021-11-05 VITALS — SYSTOLIC BLOOD PRESSURE: 126 MMHG | DIASTOLIC BLOOD PRESSURE: 98 MMHG

## 2021-11-05 VITALS — BODY MASS INDEX: 25.61 KG/M2 | HEIGHT: 75.98 IN | WEIGHT: 210.32 LBS

## 2021-11-05 VITALS — DIASTOLIC BLOOD PRESSURE: 92 MMHG | SYSTOLIC BLOOD PRESSURE: 127 MMHG

## 2021-11-05 DIAGNOSIS — H25.12: ICD-10-CM

## 2021-11-05 DIAGNOSIS — Z79.84: ICD-10-CM

## 2021-11-05 DIAGNOSIS — F32.A: ICD-10-CM

## 2021-11-05 DIAGNOSIS — Z79.01: ICD-10-CM

## 2021-11-05 DIAGNOSIS — E11.36: Primary | ICD-10-CM

## 2021-11-05 DIAGNOSIS — F41.9: ICD-10-CM

## 2021-11-05 DIAGNOSIS — I10: ICD-10-CM

## 2021-11-05 DIAGNOSIS — Z79.899: ICD-10-CM

## 2021-11-05 PROCEDURE — 82947 ASSAY GLUCOSE BLOOD QUANT: CPT

## 2021-11-05 PROCEDURE — 66984 XCAPSL CTRC RMVL W/O ECP: CPT

## 2021-11-05 RX ADMIN — TROPICAMIDE SCH ML: 10 SOLUTION/ DROPS OPHTHALMIC at 10:43

## 2021-11-05 RX ADMIN — TROPICAMIDE SCH ML: 10 SOLUTION/ DROPS OPHTHALMIC at 10:48

## 2021-11-05 RX ADMIN — TETRACAINE HYDROCHLORIDE PRN ML: 5 SOLUTION OPHTHALMIC at 10:36

## 2021-11-05 RX ADMIN — PHENYLEPHRINE HYDROCHLORIDE SCH ML: 100 SOLUTION/ DROPS OPHTHALMIC at 10:48

## 2021-11-05 RX ADMIN — TETRACAINE HYDROCHLORIDE PRN ML: 5 SOLUTION OPHTHALMIC at 10:53

## 2021-11-05 RX ADMIN — PHENYLEPHRINE HYDROCHLORIDE SCH ML: 100 SOLUTION/ DROPS OPHTHALMIC at 10:53

## 2021-11-05 RX ADMIN — TETRACAINE HYDROCHLORIDE PRN ML: 5 SOLUTION OPHTHALMIC at 10:48

## 2021-11-05 RX ADMIN — TETRACAINE HYDROCHLORIDE PRN ML: 5 SOLUTION OPHTHALMIC at 10:43

## 2021-11-05 RX ADMIN — TROPICAMIDE SCH ML: 10 SOLUTION/ DROPS OPHTHALMIC at 10:53

## 2021-11-05 RX ADMIN — PHENYLEPHRINE HYDROCHLORIDE SCH ML: 100 SOLUTION/ DROPS OPHTHALMIC at 10:43

## 2021-11-05 NOTE — ANESTHESIA-GENERAL POST-OP
MAC


Patient Condition


Mental Status/LOC:  Same as Preop


Cardiovascular:  Satisfactory


Nausea/Vomiting:  Absent


Respiratory:  Satisfactory


Pain:  Controlled


Complications:  Absent





Post Op Complications


Complications


None





Follow Up Care/Instructions


Patient Instructions


None needed.





Anesthesiology Discharge Order


Discharge Order


Patient is doing well, no complaints, stable vital signs, no apparent adverse 

anesthesia problems.   


No complications reported per nursing.











KAYLYNN OCAMPO CRNA           Nov 5, 2021 12:28

## 2021-11-05 NOTE — OPHTHALMOLOGY OPERATIVE REPORT
Cataract removal/placement IOL


PREOPERATIVE DIAGNOSIS:    Cataract Left Eye


POSTOPERATIVE DIAGNOSIS: Cataract Left Eye





PROCEDURE: Cataract removal and placement of posterior chamber implant, left eye





SURGEON: Adrian Steel 





ANESTHESIA: Topical with sedation





COMPLICATIONS: None





ESTIMATED BLOOD LOSS: Minimal 





DESCRIPTION OF PROCEDURE:


After proper informed consent was obtained, the patient, a 66 male, was taken to

the Operating Room and the left eye was anesthetized with tetracaine.  The left 

eye was then prepped and draped in the usual manner.  A wire lid speculum was 

placed. A paracentesis was made at the left hand position. Preservative free 

lidocaine was injected into the anterior chamber followed by viscoelastic.  A 

clear corneal incision was made in the temporal position. A capsulorrhexis was 

preformed and the central nuclear and cortical material were removed.  The 

posterior capsule was polished and an Antonio 13.0 AU00T0 was placed into the 

capsular bag. The residual viscoelastic was aspirated and balanced saline 

solution was injected into the anterior chamber.  Moxifloxacin was injected into

the anterior chamber.





The wound was checked and found to be water tight.





The patient tolerated the procedure well without complications.











ADRIAN STEEL MD              Nov 5, 2021 11:34

## 2021-11-05 NOTE — OPHTHALMOLOGIST PRE-OP NOTE
Pre-Operative Progress Note


H&P Reviewed


The H&P was reviewed, patient examined and no changes noted.


Date H&P Reviewed:  Nov 5, 2021


Time H&P Reviewed:  11:11


Pre-Op Dx


Cataract, Left Eye











HENNA STEEL MD              Nov 5, 2021 11:12

## 2021-11-19 ENCOUNTER — HOSPITAL ENCOUNTER (OUTPATIENT)
Dept: HOSPITAL 75 - SDC | Age: 66
Discharge: HOME | End: 2021-11-19
Attending: SPECIALIST
Payer: MEDICARE

## 2021-11-19 VITALS — BODY MASS INDEX: 25.61 KG/M2 | WEIGHT: 210.32 LBS | HEIGHT: 75.98 IN

## 2021-11-19 VITALS — DIASTOLIC BLOOD PRESSURE: 114 MMHG | SYSTOLIC BLOOD PRESSURE: 150 MMHG

## 2021-11-19 VITALS — SYSTOLIC BLOOD PRESSURE: 143 MMHG | DIASTOLIC BLOOD PRESSURE: 108 MMHG

## 2021-11-19 DIAGNOSIS — Z79.01: ICD-10-CM

## 2021-11-19 DIAGNOSIS — Z79.899: ICD-10-CM

## 2021-11-19 DIAGNOSIS — E11.36: Primary | ICD-10-CM

## 2021-11-19 DIAGNOSIS — Z79.84: ICD-10-CM

## 2021-11-19 DIAGNOSIS — H25.11: ICD-10-CM

## 2021-11-19 DIAGNOSIS — F32.A: ICD-10-CM

## 2021-11-19 DIAGNOSIS — I10: ICD-10-CM

## 2021-11-19 PROCEDURE — 66984 XCAPSL CTRC RMVL W/O ECP: CPT

## 2021-11-19 RX ADMIN — TROPICAMIDE SCH ML: 10 SOLUTION/ DROPS OPHTHALMIC at 10:57

## 2021-11-19 RX ADMIN — PHENYLEPHRINE HYDROCHLORIDE SCH ML: 100 SOLUTION/ DROPS OPHTHALMIC at 10:52

## 2021-11-19 RX ADMIN — TETRACAINE HYDROCHLORIDE PRN ML: 5 SOLUTION OPHTHALMIC at 11:02

## 2021-11-19 RX ADMIN — TETRACAINE HYDROCHLORIDE PRN ML: 5 SOLUTION OPHTHALMIC at 10:57

## 2021-11-19 RX ADMIN — TETRACAINE HYDROCHLORIDE PRN ML: 5 SOLUTION OPHTHALMIC at 10:45

## 2021-11-19 RX ADMIN — TETRACAINE HYDROCHLORIDE PRN ML: 5 SOLUTION OPHTHALMIC at 10:52

## 2021-11-19 RX ADMIN — PHENYLEPHRINE HYDROCHLORIDE SCH ML: 100 SOLUTION/ DROPS OPHTHALMIC at 11:02

## 2021-11-19 RX ADMIN — PHENYLEPHRINE HYDROCHLORIDE SCH ML: 100 SOLUTION/ DROPS OPHTHALMIC at 10:57

## 2021-11-19 RX ADMIN — TROPICAMIDE SCH ML: 10 SOLUTION/ DROPS OPHTHALMIC at 11:02

## 2021-11-19 RX ADMIN — TROPICAMIDE SCH ML: 10 SOLUTION/ DROPS OPHTHALMIC at 10:52

## 2021-11-19 NOTE — ANESTHESIA-GENERAL POST-OP
MAC


Patient Condition


Mental Status/LOC:  Same as Preop


Cardiovascular:  Satisfactory


Nausea/Vomiting:  Absent


Respiratory:  Satisfactory


Pain:  Controlled


Complications:  Absent





Post Op Complications


Complications


None





Follow Up Care/Instructions


Patient Instructions


None needed.





Anesthesiology Discharge Order


Discharge Order


Patient was seen after the procedure and he was doing well, no complaints, 

stable vital signs, no apparent adverse anesthesia problems.











AIDA WILKES DO         Nov 19, 2021 14:16

## 2021-11-19 NOTE — OPHTHALMOLOGIST PRE-OP NOTE
Pre-Operative Progress Note


H&P Reviewed


The H&P was reviewed, patient examined and no changes noted.


Date H&P Reviewed:  Nov 19, 2021


Time H&P Reviewed:  11:41


Pre-Op Dx


Cataract, Right Eye











HENNA STEEL MD             Nov 19, 2021 11:42

## 2021-11-19 NOTE — OPHTHALMOLOGY OPERATIVE REPORT
Cataract removal/placement IOL


PREOPERATIVE DIAGNOSIS: Cataract Right Eye


POSTOPERATIVE DIAGNOSIS: Cataract Right Eye





PROCEDURE: Cataract removal and placement of posterior chamber implant, right 

eye





SURGEON: Adrian Steel 





ANESTHESIA: Topical with sedation





COMPLICATIONS: None





ESTIMATED BLOOD LOSS: Minimal 





DESCRIPTION OF PROCEDURE:


After proper informed consent was obtained, the patient, a 66 male, was taken to

the Operating Room and the right eye was anesthetized with tetracaine.  The 

right eye was then prepped and draped in the usual manner.  A wire lid speculum 

was placed. A paracentesis was made at the left hand position. Preservative free

lidocaine was injected into the anterior chamber followed by viscoelastic.  A 

clear corneal incision was made in the temporal position. A capsulorrhexis was 

preformed and the central nuclear and cortical material were removed.  The 

posterior capsule was polished and Antonio 13.5 AU00T0  IOL was placed into the 

capsular bag. The residual viscoelastic was aspirated and balanced saline 

solution was injected into the anterior chamber. Moxifloxacin  was injected into

the anterior chamber.





The wound was checked and found to be water tight.





The patient tolerated the procedure well without complications.











ADRIAN STEEL MD             Nov 19, 2021 12:01